# Patient Record
Sex: FEMALE | Race: OTHER | URBAN - METROPOLITAN AREA
[De-identification: names, ages, dates, MRNs, and addresses within clinical notes are randomized per-mention and may not be internally consistent; named-entity substitution may affect disease eponyms.]

---

## 2020-12-16 ENCOUNTER — EMERGENCY (EMERGENCY)
Facility: HOSPITAL | Age: 25
LOS: 1 days | Discharge: HOME | End: 2020-12-16
Admitting: EMERGENCY MEDICINE
Payer: COMMERCIAL

## 2020-12-16 VITALS
SYSTOLIC BLOOD PRESSURE: 111 MMHG | OXYGEN SATURATION: 98 % | TEMPERATURE: 98 F | RESPIRATION RATE: 16 BRPM | HEART RATE: 87 BPM | DIASTOLIC BLOOD PRESSURE: 74 MMHG

## 2020-12-16 DIAGNOSIS — Z20.828 CONTACT WITH AND (SUSPECTED) EXPOSURE TO OTHER VIRAL COMMUNICABLE DISEASES: ICD-10-CM

## 2020-12-16 LAB — SARS-COV-2 RNA SPEC QL NAA+PROBE: SIGNIFICANT CHANGE UP

## 2020-12-16 PROCEDURE — U0003: CPT

## 2020-12-16 PROCEDURE — 99283 EMERGENCY DEPT VISIT LOW MDM: CPT

## 2020-12-16 NOTE — ED PROVIDER NOTE - PATIENT PORTAL LINK FT
You can access the FollowMyHealth Patient Portal offered by [facility] by registering at the following website: [email or web address]. By joining [employer]’s FollowMyHealth portal, you will also be able to view your health information using other applications (apps) compatible with our system.

## 2020-12-28 ENCOUNTER — EMERGENCY (EMERGENCY)
Facility: HOSPITAL | Age: 25
LOS: 1 days | Discharge: HOME | End: 2020-12-28
Admitting: EMERGENCY MEDICINE
Payer: MEDICARE

## 2020-12-28 VITALS
DIASTOLIC BLOOD PRESSURE: 79 MMHG | OXYGEN SATURATION: 99 % | SYSTOLIC BLOOD PRESSURE: 112 MMHG | RESPIRATION RATE: 16 BRPM | TEMPERATURE: 99 F | HEART RATE: 72 BPM

## 2020-12-28 DIAGNOSIS — Z20.828 CONTACT WITH AND (SUSPECTED) EXPOSURE TO OTHER VIRAL COMMUNICABLE DISEASES: ICD-10-CM

## 2020-12-28 LAB — SARS-COV-2 RNA SPEC QL NAA+PROBE: SIGNIFICANT CHANGE UP

## 2020-12-28 PROCEDURE — 99283 EMERGENCY DEPT VISIT LOW MDM: CPT

## 2020-12-28 NOTE — ED PROVIDER NOTE - OBJECTIVE STATEMENT
generally healthy pt here for requesting covid swab.  asymptomatic without any acute complaints. Denies f/c, headache, cough, sore throat, uri sxs, sob, abd pain, nvd, travel, sick contacts/exposure.

## 2020-12-28 NOTE — ED PROVIDER NOTE - NSFOLLOWUPINSTRUCTIONS_ED_ALL_ED_FT
You will received a text or email with your covid swab results within 24-48 hours.  You can also call back the number may on discharge papers for results or access patient portal.    If you have symptoms of covid 19 or were exposed you should quarantine for 14 days minimum or until symptoms resolve  If you have difficulty breathing, chest pain, dizziness, fainting, vomiting or other concerns return to ED

## 2020-12-28 NOTE — ED PROVIDER NOTE - PATIENT PORTAL LINK FT
You can access the FollowMyHealth Patient Portal offered by [facility] by registering at the following website: [email or web address]. By joining [employer]’s FollowMyHealth portal, you will also be able to view your health information using other applications (apps) compatible with our system.

## 2021-01-25 ENCOUNTER — EMERGENCY (EMERGENCY)
Facility: HOSPITAL | Age: 26
LOS: 1 days | Discharge: HOME | End: 2021-01-25
Admitting: EMERGENCY MEDICINE
Payer: MEDICARE

## 2021-01-25 VITALS
TEMPERATURE: 98 F | DIASTOLIC BLOOD PRESSURE: 83 MMHG | HEART RATE: 85 BPM | RESPIRATION RATE: 18 BRPM | OXYGEN SATURATION: 98 % | SYSTOLIC BLOOD PRESSURE: 126 MMHG

## 2021-01-25 DIAGNOSIS — Z20.822 CONTACT WITH AND (SUSPECTED) EXPOSURE TO COVID-19: ICD-10-CM

## 2021-01-25 LAB — SARS-COV-2 RNA SPEC QL NAA+PROBE: SIGNIFICANT CHANGE UP

## 2021-01-25 PROCEDURE — 99283 EMERGENCY DEPT VISIT LOW MDM: CPT

## 2021-01-25 NOTE — ED PROVIDER NOTE - PATIENT PORTAL LINK FT
You can access the FollowMyHealth Patient Portal offered by [facility] by registering at the following website: [email or web address]. By joining [employer]’s FollowMyHealth portal, you will also be able to view your health information using other applications (apps) compatible with our system.

## 2023-10-23 NOTE — ED PROVIDER NOTE - NS_EDPROVIDERDISPOUSERTYPE_ED_A_ED
Subjective   Patient ID: Carole Sanabria is a 32 year old female.    Carole Sanabria Declined a chaperone.    Chief Complaint   Patient presents with   • Follow-up     F/u from colpo     HPI   Pt here for repeat PAP after   Last Pap 4/2022 LSIL/Pos  Follow up colpo: CIN1    12/2022 LSIL + HPV  Colpo HPV change    Has Nexplanon in place  Reports very irregular bleeding sometimes lasting 2 weeks  Her 2 yrs would be due 2/2024  She uses about 5 pads per day  Concerned that bleeding may be leading to anemia  States she was told before that her \"uterus was broken\" and wants to know if she needs surgery    US 6/2022    FINDINGS:   The uterus measures 5.6 x 2.6 x 3.6 cm.  The uterine myometrium is  relatively homogenous and no focal fibroid is identified.  The endometrium  measures 0.2 cm which is within normal limits.  There is suggestion of  heterogenous fluid in the endocervical canal, possibly representing blood  products.     The right ovary measures 3.5 x 1.7 x 1.9 cm.  1.5 cm septated cyst versus  cluster of follicles in the right ovary.  The left ovary measures 2.7 x 1.6  x 1.8 cm.  No abnormal adnexal mass on the left.     No free fluid in the pelvis.     IMPRESSION:     1.    Heterogenous contents within the endocervical canal, possibly  representing blood products.  2.   Septated right ovarian cyst versus cluster of follicles.      Patient's medications, allergies, past medical, surgical, social and family histories were reviewed and updated as appropriate.    Review of Systems    Objective   OBGyn Exam  Gen: NAD, A, A, O x 3  Pelvic  Ext: wnl  Vagina: yellow vag dc  Cervix: wnl  Bimanual: no palpable masses, no uterine or adnexal tenderness    Assessment   Problem List Items Addressed This Visit        Genitourinary and Reproductive    Right ovarian cyst - Primary     Repeat US given R ovarian cyst 2022 as well as her bleeding  Discussed more than likely due to Nexplanon  R/o anemia         Relevant Orders    POCT US  PELVIS COMPLETE WITH SONOHYSTEROGRAM IN OFFICE    Need for HPV vaccination     Recommend complete HPV vaccine course        Other Visit Diagnoses     Irregular menses        Relevant Orders    Iron And total Iron Binding Capacity    Cervical cancer screening        Relevant Orders    Pap Test    Vaginal discharge        Relevant Orders    SwabOne Bacterial Vaginosis by GINGER    SwabOne Candida/Trichomonas Panel by GINGER         I have personally evaluated and examined the patient. The Attending was available to me as a supervising provider if needed.

## 2024-08-07 NOTE — ED ADULT TRIAGE NOTE - CAS DISCH ACCOMP BY
[facility] Radiation Treatment Management Note 6-10    Patient:  Harleen Euceda  Age:  45 year old  Visit Diagnosis:    1. Malignant neoplasm of upper-outer quadrant of left breast in female, estrogen receptor positive (HCC)      Primary Rad/Onc:  Dr. Alise Davis    Site Delivered Dose (cGy) Prescribed Dose (cGy) Fraction #   L CW NO BOLUS 0 900 0/5   L CW BOLUS 1260 4140 7/23     First treatment date:  7/31/24  Concurrent chemotherapy: N/A        7/22/2024     3:12 PM 8/1/2024     3:25 PM 8/8/2024     8:50 AM   Oncology Vitals   Height 5' 2.008\"     Height 158 cm     Weight 165 lb     Weight 74.844 kg     BSA (m2) 1.76 m2     BMI 30.17 kg/m2     /86 135/90 122/85   Pulse 70 86 72   Resp 14 18 18   Temp 98.2 °F (36.8 °C) 98.8 °F (37.1 °C) 98.2 °F (36.8 °C)   SpO2 100 % 100 % 100 %   Pain Score 0 0 0        Toxicities:  Fatigue Grade 0= None  Pruritis Grade 0= None  Dry Skin absent  Dermatitis associated with radiation Grade 0= None  Moisturizer used Aquaphor applied Number of times: 2 times daily.  Hyperpigmentation absent        Nursing Note:  No complaints   Skin intact without redness  Using aquaphor 1-2x/day    Barbara PALMER RN MD NOTE   Reviewed and agree with RN note above.  Setup imaging reviewed in [facility] and approved.    S:  -doing well    O:  -mild redness    A/P:  -start Elocon BID  OTV in 1 week    Alise Davis MD  Radiation Oncology        
Self